# Patient Record
Sex: FEMALE | Race: WHITE
[De-identification: names, ages, dates, MRNs, and addresses within clinical notes are randomized per-mention and may not be internally consistent; named-entity substitution may affect disease eponyms.]

---

## 2019-10-02 ENCOUNTER — HOSPITAL ENCOUNTER (OUTPATIENT)
Dept: HOSPITAL 47 - FBPOP | Age: 39
Discharge: HOME | End: 2019-10-02
Attending: OBSTETRICS & GYNECOLOGY
Payer: COMMERCIAL

## 2019-10-02 VITALS
RESPIRATION RATE: 16 BRPM | HEART RATE: 88 BPM | TEMPERATURE: 98 F | DIASTOLIC BLOOD PRESSURE: 70 MMHG | SYSTOLIC BLOOD PRESSURE: 133 MMHG

## 2019-10-02 DIAGNOSIS — O26.852: Primary | ICD-10-CM

## 2019-10-02 DIAGNOSIS — Z3A.24: ICD-10-CM

## 2019-10-02 PROCEDURE — 96365 THER/PROPH/DIAG IV INF INIT: CPT

## 2019-10-02 PROCEDURE — 99214 OFFICE O/P EST MOD 30 MIN: CPT

## 2019-10-02 PROCEDURE — 76815 OB US LIMITED FETUS(S): CPT

## 2019-10-02 NOTE — US
EXAMINATION TYPE: US OB limited

 

DATE OF EXAM: 10/2/2019

 

COMPARISON: NONE

 

CLINICAL HISTORY: bleeding, placental postion. Bleeding, placental position. Rule out retroplacental 
bleeding/Clotting. .

 

EXAM PERFORMED:  Transabdominal (TA)

 

GESTATIONAL AGE / DATING

Physician Established: (24 weeks/2 days)

** EDC: 2020

 

** No growth performed on today?s study per ordering physician 

 

FETAL SURVEY

 

PLACENTA:  Posterior

The placenta does appear low. Unable to visualize cervical canal to determine how close placenta is t
o cervix. Limited due to shadowing.

 

Hypoechoic area seen within the placenta measuring: 1.6 x 1.7 x 1.5 cm. 

Hypoechoic areas seen posterior to placenta -?Normal placental attachment?

 

HEART RATE:  142 bpm

RHYTHM:  Normal

 

 

 

 

 

IMPRESSION:  Low lying posterior placenta. No definite placenta previa. No evidence of placental abru
ption.

## 2019-10-13 NOTE — P.MSEPDOC
Presenting Problems





- Arrival Data


Date of Arrival on Unit: 10/02/19


Time of Arrival on Unit: 18:41


Mode of Transport: Portable





- Complaint


OB-Reason for Admission/Chief Complaint: Vaginal Bleeding


Comment: blood clot passed at home





Prenatal Medical History





- Pregnancy Information


: 1


Para: 0


Term: 0


: 0


Abortions: Spontaneous or Elective: 0


Number of Living Children: 0





- Gestational Age


Gestational Age by MICHAEL (wks/days): 24 Weeks and 2 Days





Review of Systems





- Review of Systems


Constitutional: No problems


Breast: No problems


ENT: No problems


Cardiovascular: No problems


Respiratory: No problems


Gastrointestinal: No problems


Genitourinary: No problems


Musculoskeletal: No problems


Neurological: No problems


Skin: No problems





Vital Signs





- Temperature


Temperature: 98 F


Temperature Source: Temporal Artery Scan





- Pulse


  ** Right Brachial


Pulse Rate: 88


Pulse Assessment Method: Automatic Cuff





- Respirations


Respiratory Rate: 16


Oxygen Delivery Method: Room Air





- Blood Pressure


  ** Right Arm


Blood Pressure: 133/70


Blood Pressure Mean: 91


Blood Pressure Source: Automatic Cuff





Medical Screen Scoring (Pre)





- Cervical Exam


Dilation: Exam Deferred


Effacement: Exam Deferred


Membranes: Intact





- Uterine Contractions


Frequency: N/A


Duration: N/A


Intensity: N/A





- Maternal Vital Signs


Maternal Temperature: N/A


Maternal Blood Pressure: N/A


Signs of Preeclampsia: N/A


Maternal Respirations: N/A





- Maternal Trauma


Maternal Trauma: N/A





- Fetal Assessment - Baby A


Baseline FHR: 125-130


Fetal Heart Rate - NICHD Category: Category I (Normal) = 0


Fetal Position: N/A


Fetal Station: N/A





- Total Score - Baby A


Total Score - Baby A: 0





- Total Score - Baby B


Total Score - Baby B: 0





- Total Score - Baby C


Total Score - Baby C: 0





- Level of Risk - Baby A


Level of Risk - Baby A: Low (0-5)





- Level of Risk - Baby B


Level of Risk - Baby B: Low (0-5)





- Level of Risk - Baby C


Level of Risk - Baby C: Low (0-5)





Medical Screen Scoring (Post)





- Fetal Assessment - Baby A


Fetal Heart Rate: 140


Fetal Heart Rate - NICHD Category: Category I (Normal) = 0





- Total Score


Total Score - Baby A: 0





- Post Treatment Level of Risk


Post Treatment Level of Risk - Baby A: Low (0-5)





Physician Notification (Post)





- Physician Notified


Physician Notified Date: 10/02/19


Physician Notified Time: 20:46


Spoke With: Juanita


New Order Received: Yes





- Notification Comment


Comment: u/s states low llying placenta, no evidence of abruption. Dicharge with

instruction to follow up with Alessandro tomorrow, pelvic rest until then.





Disposition





- Disposition


OB Disposition: Discharge to home, Written follow up instructions reviewed


Discharge Date: 10/02/19


Discharge Time: 20:55


I agree with the RN Medical Screening Exam: Yes


Risk & Benefit of care provided described in d/c instruction: Yes


Diagnosis: SPOTTING COMPLICATING PREGNANCY, SECOND TRIMESTER

## 2020-01-18 ENCOUNTER — HOSPITAL ENCOUNTER (INPATIENT)
Dept: HOSPITAL 47 - FBPOP | Age: 40
LOS: 3 days | Discharge: HOME | End: 2020-01-21
Attending: OBSTETRICS & GYNECOLOGY | Admitting: OBSTETRICS & GYNECOLOGY
Payer: COMMERCIAL

## 2020-01-18 VITALS — RESPIRATION RATE: 16 BRPM

## 2020-01-18 DIAGNOSIS — Z82.49: ICD-10-CM

## 2020-01-18 DIAGNOSIS — O32.4XX0: Primary | ICD-10-CM

## 2020-01-18 DIAGNOSIS — Z3A.39: ICD-10-CM

## 2020-01-18 DIAGNOSIS — O33.9: ICD-10-CM

## 2020-01-18 LAB
BASOPHILS # BLD AUTO: 0.1 K/UL (ref 0–0.2)
BASOPHILS NFR BLD AUTO: 1 %
EOSINOPHIL # BLD AUTO: 0.2 K/UL (ref 0–0.7)
EOSINOPHIL NFR BLD AUTO: 1 %
ERYTHROCYTE [DISTWIDTH] IN BLOOD BY AUTOMATED COUNT: 4.05 M/UL (ref 3.8–5.4)
ERYTHROCYTE [DISTWIDTH] IN BLOOD: 14.1 % (ref 11.5–15.5)
HCT VFR BLD AUTO: 36.2 % (ref 34–46)
HGB BLD-MCNC: 11.6 GM/DL (ref 11.4–16)
LYMPHOCYTES # SPEC AUTO: 1.3 K/UL (ref 1–4.8)
LYMPHOCYTES NFR SPEC AUTO: 12 %
MCH RBC QN AUTO: 28.5 PG (ref 25–35)
MCHC RBC AUTO-ENTMCNC: 31.9 G/DL (ref 31–37)
MCV RBC AUTO: 89.4 FL (ref 80–100)
MONOCYTES # BLD AUTO: 0.4 K/UL (ref 0–1)
MONOCYTES NFR BLD AUTO: 3 %
NEUTROPHILS # BLD AUTO: 9.5 K/UL (ref 1.3–7.7)
NEUTROPHILS NFR BLD AUTO: 82 %
PLATELET # BLD AUTO: 241 K/UL (ref 150–450)
WBC # BLD AUTO: 11.7 K/UL (ref 3.8–10.6)

## 2020-01-18 PROCEDURE — 99213 OFFICE O/P EST LOW 20 MIN: CPT

## 2020-01-18 PROCEDURE — 84112 EVAL AMNIOTIC FLUID PROTEIN: CPT

## 2020-01-18 PROCEDURE — 85025 COMPLETE CBC W/AUTO DIFF WBC: CPT

## 2020-01-18 PROCEDURE — 59025 FETAL NON-STRESS TEST: CPT

## 2020-01-18 PROCEDURE — 86901 BLOOD TYPING SEROLOGIC RH(D): CPT

## 2020-01-18 PROCEDURE — 86900 BLOOD TYPING SEROLOGIC ABO: CPT

## 2020-01-18 PROCEDURE — 86850 RBC ANTIBODY SCREEN: CPT

## 2020-01-18 RX ADMIN — POTASSIUM CHLORIDE SCH MLS/HR: 14.9 INJECTION, SOLUTION INTRAVENOUS at 13:15

## 2020-01-18 RX ADMIN — POTASSIUM CHLORIDE SCH MLS/HR: 14.9 INJECTION, SOLUTION INTRAVENOUS at 16:34

## 2020-01-18 RX ADMIN — POTASSIUM CHLORIDE SCH MLS/HR: 14.9 INJECTION, SOLUTION INTRAVENOUS at 14:33

## 2020-01-18 RX ADMIN — POTASSIUM CHLORIDE SCH MLS/HR: 14.9 INJECTION, SOLUTION INTRAVENOUS at 10:32

## 2020-01-18 RX ADMIN — AMPICILLIN SODIUM SCH MLS/HR: 1 INJECTION, POWDER, FOR SOLUTION INTRAMUSCULAR; INTRAVENOUS at 23:54

## 2020-01-18 RX ADMIN — AMPICILLIN SODIUM SCH MLS/HR: 1 INJECTION, POWDER, FOR SOLUTION INTRAMUSCULAR; INTRAVENOUS at 19:32

## 2020-01-19 RX ADMIN — KETOROLAC TROMETHAMINE SCH MG: 30 INJECTION, SOLUTION INTRAMUSCULAR at 02:29

## 2020-01-19 RX ADMIN — KETOROLAC TROMETHAMINE SCH: 30 INJECTION, SOLUTION INTRAMUSCULAR at 18:30

## 2020-01-19 RX ADMIN — DOCUSATE SODIUM AND SENNOSIDES SCH: 50; 8.6 TABLET ORAL at 21:29

## 2020-01-19 RX ADMIN — IBUPROFEN PRN MG: 600 TABLET ORAL at 21:26

## 2020-01-19 RX ADMIN — POTASSIUM CHLORIDE SCH MLS/HR: 14.9 INJECTION, SOLUTION INTRAVENOUS at 04:35

## 2020-01-19 RX ADMIN — DOCUSATE SODIUM AND SENNOSIDES SCH: 50; 8.6 TABLET ORAL at 09:39

## 2020-01-19 RX ADMIN — POTASSIUM CHLORIDE SCH: 14.9 INJECTION, SOLUTION INTRAVENOUS at 18:30

## 2020-01-19 RX ADMIN — POTASSIUM CHLORIDE SCH: 14.9 INJECTION, SOLUTION INTRAVENOUS at 10:16

## 2020-01-19 RX ADMIN — KETOROLAC TROMETHAMINE SCH: 30 INJECTION, SOLUTION INTRAMUSCULAR at 12:28

## 2020-01-19 NOTE — P.PNOBGPC
Subjective





- Subjective


Principal diagnosis: Postop day 0


Interval history: 





Patient is doing very well this morning.  She is being to ambulate and she is 

tolerating a liquid diet thus far.  Continue current care.


Patient reports: Reports appetite normal, Reports voiding normally, Reports pain

well controlled, Reports ambulating normally


[place]: doing well





Objective





- Vital Signs


Latest vital signs: 


                                   Vital Signs











  Temp Pulse Resp BP Pulse Ox


 


 20 12:00  98.2 F  91  16  104/60  98


 


 20 08:00  98.1 F  97  16  100/68  99


 


 20 03:55  97.6 F  80  16  102/64  99


 


 20 03:25  96.9 F L  91  16  99/60  98


 


 20 02:55  96.8 F L  86  16  112/60  96


 


 20 02:40  97.2 F L  77  16  100/67  97


 


 20 02:25   86  16  93/67  96


 


 20 02:10  97.0 F L  76  16  93/61  97


 


 20 01:55  96.8 F L  78  16  95/62  97








                                Intake and Output











 20





 22:59 06:59 14:59


 


Output Total 300 250 350


 


Balance -300 -250 -350


 


Output:   


 


  Urine 300 250 350


 


Other:   


 


  # Voids   1














- Exam


Lungs: bilateral: normal


Chest: Normal S1, Normal S2


Extremities: Present: normal


Abdomen: Present: normal appearance, soft.  Absent: distention, tenderness


Incision: Present: normal, dry, intact


Uterus: Present: normal, firm

## 2020-01-19 NOTE — P.HPOB
History of Present Illness


H&P Date: 20


Chief Complaint: Intrauterine pregnancy at term: Advanced maternal age





Patient is a 39-year-old female  at 39 weeks 5 days gestation who arrives 

complaining of spontaneous rupture membranes.  She relates she ruptured her 

memories approximately 8 AM and clear fluid was noted.  An Essure was positive. 

She was not having any contractions or feeling any increase in pressure.  She 

was dilated to 120 cm 60 is 70% effaced -3 station.  She was admitted for labor 

and group B strep prophylaxis was initiated.  Pertinent labs fluid A+ blood 

type, Rh and it was negative, rubella is immune, hepatitis B surface antigen and

RPR are both negative.  Her pregnancy course other than advanced maternal age 

has been relatively unremarkable.  She was a transfer to my care sometime around

20 weeks.  She's been followed with nonstress tests closely through the latter 

part of the pregnancy.  All questions were answered for this morning and Pitocin

augmentation of labor is expected.  We'll plan epidural when she is in active 

labor.  A category 1 tracing is noted at this time.





Past Medical History


Past Medical History: No Reported History


History of Any Multi-Drug Resistant Organisms: None Reported


Additional Past Surgical History / Comment(s): wisdom teeth


Past Anesthesia/Blood Transfusion Reactions: No Reported Reaction


Past Psychological History: No Psychological Hx Reported


Smoking Status: Never smoker





- Past Family History


  ** Mother


Family Medical History: Hypertension





Medications and Allergies


                                Home Medications











 Medication  Instructions  Recorded  Confirmed  Type


 


Pnv No.95/Ferrous Fum/Folic AC 1 each PO 10/02/19  History





[Prenatal Multivitamin Tablet]    


 


Pnv No.95/Ferrous Fum/Folic AC 1 each PO DAILY 10/02/19 10/02/19 History





[Prenatal Multivitamin Tablet]    








                                    Allergies











Allergy/AdvReac Type Severity Reaction Status Date / Time


 


No Known Allergies Allergy   Verified 20 09:28














Exam


Osteopathic Statement: *.  No significant issues noted on an osteopathic 

structural exam other than those noted in the History and Physical/Consult.


                                   Vital Signs











  Temp Pulse Resp BP Pulse Ox


 


 20 10:05  96.4 F L  81  16  120/79  100


 


 20 09:44  96.4 F L  81  16  120/79  100








                                Intake and Output











 20





 14:59 22:59 06:59


 


Other:   


 


  # Voids 1  


 


  Weight 74.389 kg  














- OBG Physical Exam


Breast: both: normal (no masses)


Abdomen: bowel sounds normal, no diffuse tenderness, no bruit present, no 

guarding noted, no hepatomegaly, no splenomegaly, no mass


Vulva: both: normal


Vagina: normal moisture, no discharge


Cervix: no lesion, no discharge


Uterus: normal size, normal contour


Adnexa: both: normal


Anus/Rectum: normal perianal skin, no rectal mass, no hemorrhoids, heme negative





Results


Result Diagrams: 


                                 20 10:30





                  Abnormal Lab Results - Last 24 Hours (Table)











  20 Range/Units





  10:30 


 


WBC  11.7 H  (3.8-10.6)  k/uL


 


Neutrophils #  9.5 H  (1.3-7.7)  k/uL

## 2020-01-19 NOTE — P.OP
Date of Procedure: 20


Preoperative Diagnosis: 


Intrauterine pregnancy at term: Failure to descend


Postoperative Diagnosis: 


Same with oh position and cephalopelvic disproportion


Procedure(s) Performed: 


Primary low transverse  section


Anesthesia: spinal


Surgeon: George Rucker


Assistant #1: Tea Samson


Estimated Blood Loss (ml): 400


IV fluids (ml): 1,000


Urine output (ml): 100


Pathology: none sent


Condition: stable


Disposition: floor


Operative Findings: 


Female  Apgar scores of 8 and 9 at one and 5 minutes [place] and the 

weight was 7 lbs. 13 oz.  During the course of the  section there was 

blood noted in the catheter and therefore sterile milk was used to verify 

patency of the bladder


Description of Procedure: 


Patient was taken to the operating suite where a spinal anesthetic was found be 

adequate.  She was prepped and draped in normal sterile fashion and placed in 

dorsal lithotomy position.  Risks and benefits had Kartik been reviewed with the 

patient.  She had been pushing for a little over 2-1/2 hours and had made 

minimal descent and a  section was indicated.  Once she was prepped and 

draped and placed in dorsal supine position with leftward tilt, a Pfannenstiel 

skin incision was made.  This incision was then carried through to underlying 

layer of the fascia with the second knife and the fascia was then nicked in the 

midline.  This opening was then extended with Person scissors.  Superior and 

inferior aspect of this incision were then grasped tented up and bluntly and 

sharply dissected off the rectus muscles.  Rectus muscles were then divided the 

midline and blunt dissection through the peritoneum was made.  This opening was 

then extended superiorly and inferiorly with good visualization of both bowel 

bladder.  Bladder blade was then placed and the knife was then used to incise 

the uterus well above the bladder flap.  This was fully developed with hemostat 

and then bluntly extended.  Head was then atraumatically delivered.  Large Was 

noted occiput posterior position was noted it is noted that the baby was well in

the pelvis and well applied in this likely caused the blood in her urine.  Once 

baby's head was delivered anterior posterior shoulders were easily delivered and

meconium was noted at the point of entry into the uterus.  Mouth and nares were 

bulb suctioned and nursery personnel was present present to assume care.  Umbili

pacheco cord was then clamped cut usual fashion.  Once this was accomplished 

placenta was then delivered intact and Pitocin was added to the IV.  Uterus was 

then exteriorized cleared of clots and debris and closed in 2 layers with 0 

Vicryl suture following delineation of boundaries with Allis clamps.  Once this 

was completed and excellent hemostasis was obtained it was noted that there was 

blood in the Hammond catheter bag and some in the tubing.  Sterile milk was then 

instilled into the bladder retrograde by taking approximately between 182 and 40

mL of sterile milk and injecting it into the bladder.  Bladder patency was 

verified no spillage or other damage to the bladder was noted therefore Hammond 

cath was re-established and using hemostats to delineate the peritoneum was 

reapproximated with 0 Vicryl suture.  Fascial layer was then closed with 0 

Vicryl suture.  One layer of 3-0 Vicryl was placed in deep subcuticular tissues 

to reapproximate the skin and close the dead space.  Skin was then closed with 

3-0 Vicryl subcuticularly.  Sponge, lap, needle counts were all correct 2.  

Patient was then taken to the recovery room in stable and satisfactory 

condition.  Urine in the tubing is already clearing to yellow.

## 2020-01-20 LAB
BASOPHILS # BLD AUTO: 0 K/UL (ref 0–0.2)
BASOPHILS NFR BLD AUTO: 0 %
EOSINOPHIL # BLD AUTO: 0.1 K/UL (ref 0–0.7)
EOSINOPHIL NFR BLD AUTO: 1 %
ERYTHROCYTE [DISTWIDTH] IN BLOOD BY AUTOMATED COUNT: 3.1 M/UL (ref 3.8–5.4)
ERYTHROCYTE [DISTWIDTH] IN BLOOD: 14.1 % (ref 11.5–15.5)
HCT VFR BLD AUTO: 28 % (ref 34–46)
HGB BLD-MCNC: 9 GM/DL (ref 11.4–16)
LYMPHOCYTES # SPEC AUTO: 1.3 K/UL (ref 1–4.8)
LYMPHOCYTES NFR SPEC AUTO: 10 %
MCH RBC QN AUTO: 29.1 PG (ref 25–35)
MCHC RBC AUTO-ENTMCNC: 32.2 G/DL (ref 31–37)
MCV RBC AUTO: 90.3 FL (ref 80–100)
MONOCYTES # BLD AUTO: 0.4 K/UL (ref 0–1)
MONOCYTES NFR BLD AUTO: 3 %
NEUTROPHILS # BLD AUTO: 10.7 K/UL (ref 1.3–7.7)
NEUTROPHILS NFR BLD AUTO: 84 %
PLATELET # BLD AUTO: 217 K/UL (ref 150–450)
WBC # BLD AUTO: 12.7 K/UL (ref 3.8–10.6)

## 2020-01-20 RX ADMIN — DOCUSATE SODIUM AND SENNOSIDES SCH: 50; 8.6 TABLET ORAL at 21:29

## 2020-01-20 RX ADMIN — HYDROCODONE BITARTRATE AND ACETAMINOPHEN PRN EACH: 7.5; 325 TABLET ORAL at 08:19

## 2020-01-20 RX ADMIN — IBUPROFEN PRN MG: 600 TABLET ORAL at 09:34

## 2020-01-20 RX ADMIN — DOCUSATE SODIUM AND SENNOSIDES SCH EACH: 50; 8.6 TABLET ORAL at 23:45

## 2020-01-20 RX ADMIN — HYDROCODONE BITARTRATE AND ACETAMINOPHEN PRN EACH: 7.5; 325 TABLET ORAL at 15:12

## 2020-01-20 RX ADMIN — IBUPROFEN PRN MG: 600 TABLET ORAL at 21:03

## 2020-01-20 NOTE — P.PN
Progress Note - Text


Progress Note Date: 20


Postoperative day 1 status post  section under spinal anesthesia, and i

ntrathecal morphine given for postoperative analgesia, patient doing well, there

is no anesthesia related complications, 


Patient had no headache, vital signs stable , 


Assessment and plan= postop day 1 status post , doing well there is no 

anesthesia related  complication.

## 2020-01-21 VITALS — SYSTOLIC BLOOD PRESSURE: 114 MMHG | TEMPERATURE: 97.6 F | HEART RATE: 94 BPM | DIASTOLIC BLOOD PRESSURE: 72 MMHG

## 2020-01-21 RX ADMIN — HYDROCODONE BITARTRATE AND ACETAMINOPHEN PRN EACH: 7.5; 325 TABLET ORAL at 08:49

## 2020-01-21 RX ADMIN — DOCUSATE SODIUM AND SENNOSIDES SCH EACH: 50; 8.6 TABLET ORAL at 08:50

## 2020-01-21 RX ADMIN — IBUPROFEN PRN MG: 600 TABLET ORAL at 15:35

## 2020-01-21 NOTE — P.DS
Providers


Date of admission: 


01/18/20 09:44





Expected date of discharge: 01/21/20


Attending physician: 


George Rucker





Primary care physician: 


Stated None





Hospital Course: 





Patient is doing very well post op day 2.  She is involuting, voiding and she 

has not had a bowel movement.  She is requesting discharge to home today.  Vital

signs are stable and afebrile.  Heart regular, lungs clear, extremities without 

pain.  Abdomen soft and in her urine incision is clean dry and intact.  

Prescription for Motrin and Norco or provided.  All questions are answered for 

her at this time and she is stable for discharge this time.  She will follow up 

with me in 1 week.


Patient Condition at Discharge: Good





Plan - Discharge Summary


New Discharge Prescriptions: 


New


   Ibuprofen [Motrin] 600 mg PO Q6HR PRN #30 tab


     PRN Reason: Pain


   HYDROcodone/APAP 5-325MG [Norco 5-325] 1 tab PO Q4HR PRN #30 tab


     PRN Reason: Pain





No Action


   Pnv No.95/Ferrous Fum/Folic AC [Prenatal Multivitamin Tablet] 1 each PO DAILY


   Pnv No.95/Ferrous Fum/Folic AC [Prenatal Multivitamin Tablet] 1 each PO


Discharge Medication List





Pnv No.95/Ferrous Fum/Folic AC [Prenatal Multivitamin Tablet] 1 each PO 10/02/19

[History]


Pnv No.95/Ferrous Fum/Folic AC [Prenatal Multivitamin Tablet] 1 each PO DAILY 

10/02/19 [History]


HYDROcodone/APAP 5-325MG [Norco 5-325] 1 tab PO Q4HR PRN #30 tab 01/21/20 [Rx]


Ibuprofen [Motrin] 600 mg PO Q6HR PRN #30 tab 01/21/20 [Rx]








Follow up Appointment(s)/Referral(s): 


George Rucker DO [Doctor of Osteopathic Medicine] - 1 Week


Activity/Diet/Wound Care/Special Instructions: 


No heavy lifting, limit stairs and driving, and pelvic rest.  If any high 

temperatures, heavy bleeding, or severe pain call my office


Discharge Disposition: HOME SELF-CARE

## 2020-01-21 NOTE — P.PNOBGPC
Subjective





- Subjective


Principal diagnosis: Postoperative day 2


Interval history: 





Patient is doing very well this morning other than pain.  She is involuting, 

voiding and she is passing flatus.  She has not had a bowel movement.  Otherwise

she is doing well.  Her incision is intact.


Patient reports: Reports appetite normal, Reports voiding normally, Reports pain

well controlled, Reports ambulating normally


: doing well





Objective





- Vital Signs


Latest vital signs: 


                                   Vital Signs











  Temp Pulse Resp BP Pulse Ox


 


 20 00:00  98 F  97  16  124/76  98


 


 20 16:00  97.9 F  93  16  104/66 


 


 20 08:42  98.6 F  97  16  103/59 














- Exam


Lungs: bilateral: normal


Chest: Normal S1, Normal S2


Extremities: Present: normal


Abdomen: Present: normal appearance, soft.  Absent: distention, tenderness


Incision: Present: normal, dry, intact


Uterus: Present: normal, firm





- Labs


Labs: 


                  Abnormal Lab Results - Last 24 Hours (Table)











  20 Range/Units





  06:53 


 


WBC  12.7 H  (3.8-10.6)  k/uL


 


RBC  3.10 L  (3.80-5.40)  m/uL


 


Hgb  9.0 L D  (11.4-16.0)  gm/dL


 


Hct  28.0 L  (34.0-46.0)  %


 


Neutrophils #  10.7 H  (1.3-7.7)  k/uL

## 2025-07-17 ENCOUNTER — HOSPITAL ENCOUNTER (OUTPATIENT)
Dept: HOSPITAL 47 - LABWHC1 | Age: 45
Discharge: HOME | End: 2025-07-17
Attending: FAMILY MEDICINE
Payer: COMMERCIAL

## 2025-07-17 DIAGNOSIS — N28.9: ICD-10-CM

## 2025-07-17 DIAGNOSIS — E78.5: ICD-10-CM

## 2025-07-17 DIAGNOSIS — E83.51: ICD-10-CM

## 2025-07-17 DIAGNOSIS — E87.8: ICD-10-CM

## 2025-07-17 DIAGNOSIS — D64.9: ICD-10-CM

## 2025-07-17 DIAGNOSIS — Z00.00: Primary | ICD-10-CM

## 2025-07-17 DIAGNOSIS — E11.9: ICD-10-CM

## 2025-07-17 DIAGNOSIS — R74.01: ICD-10-CM

## 2025-07-17 LAB
ACANTHOCYTES BLD QL SMEAR: (no result)
AGRAN PLATELETS BLD QL SMEAR: (no result)
ALBUMIN SERPL-MCNC: 4.5 G/DL (ref 3.8–4.9)
ALBUMIN/GLOB SERPL: 1.96 RATIO (ref 1.6–3.17)
ALP SERPL-CCNC: 70 U/L (ref 41–126)
ALT SERPL-CCNC: 13 U/L (ref 8–44)
ANION GAP SERPL CALC-SCNC: 9.3 MMOL/L (ref 4–12)
ANISOCYTOSIS BLD QL SMEAR: (no result)
ANISOCYTOSIS BLD QL: (no result)
AST SERPL-CCNC: 14 U/L (ref 13–35)
AUER BODIES BLD QL SMEAR: (no result)
BASO STIPL BLD QL SMEAR: (no result)
BASOPHILS # BLD AUTO: 0.03 X 10*3/UL (ref 0–0.1)
BASOPHILS # BLD MANUAL: (no result) K/UL (ref 0–0.2)
BASOPHILS NFR BLD AUTO: 0.5 %
BASOPHILS NFR SPEC MANUAL: (no result) %
BILIRUB BLD-MCNC: 0.4 MG/DL (ref 0.3–1.2)
BLASTS # BLD MANUAL: (no result) %
BLASTS # BLD MANUAL: (no result) K/UL
BUN SERPL-SCNC: 7.9 MG/DL (ref 9–27)
BUN/CREAT SERPL: 9.88 RATIO (ref 12–20)
BURR CELLS BLD QL SMEAR: (no result)
BURR CELLS BLD QL SMEAR: (no result)
CALCIUM SPEC-MCNC: 9.2 MG/DL (ref 8.7–10.3)
CELLS COUNTED: (no result)
CHLORIDE SERPL-SCNC: 105 MMOL/L (ref 96–109)
CHOLEST SERPL-MCNC: 201 MG/DL (ref 0–200)
CHOLEST/HDLC SERPL: 2.91 RATIO
CO2 SERPL-SCNC: 24.7 MMOL/L (ref 21.6–31.8)
CREATININE: 0.8 MG/DL (ref 0.6–1.5)
DACRYOCYTES BLD QL SMEAR: (no result)
DOHLE BOD BLD QL SMEAR: (no result)
ELLIPTOCYTES BLD QL SMEAR: (no result)
EOSINOPHIL # BLD AUTO: 0.13 X 10*3/UL (ref 0.04–0.35)
EOSINOPHIL # BLD MANUAL: (no result) K/UL (ref 0–0.7)
EOSINOPHIL NFR BLD AUTO: 2 %
EOSINOPHIL NFR BLD MANUAL: (no result) %
ERYTHROCYTE [DISTWIDTH] IN BLOOD BY AUTOMATED COUNT: 4.62 X 10*6/UL (ref 4.1–5.2)
ERYTHROCYTE [DISTWIDTH] IN BLOOD: 12.8 % (ref 11.5–14.5)
GIANT PLATELETS BLD QL SMEAR: (no result)
GLOBULIN SER CALC-MCNC: 2.3 G/DL (ref 1.6–3.3)
GLUCOSE SERPL-MCNC: 106 MG/DL (ref 70–110)
HCT VFR BLD AUTO: 42.5 % (ref 37.2–46.3)
HDLC SERPL-MCNC: 69 MG/DL (ref 40–60)
HELMET CELLS BLD QL SMEAR: (no result)
HGB BLD-MCNC: 13.7 G/DL (ref 12–15)
HOWELL-JOLLY BOD BLD QL SMEAR: (no result)
HYPERCHROMASIA: (no result)
HYPOCHROMIA BLD QL SMEAR: (no result)
HYPOCHROMIA BLD QL: (no result)
IMM GRANULOCYTES # BLD: 0.02 X 10*3/UL (ref 0–0.04)
IMM GRANULOCYTES BLD QL AUTO: 0.3 %
LDLC SERPL CALC-MCNC: 115.1 MG/DL (ref 0–131)
LDLC SERPL DIRECT ASSAY-MCNC: (no result) MG/DL
LG PLATELETS BLD QL SMEAR: (no result)
LYMPHOCYTES # BLD MANUAL: (no result) K/UL (ref 1–4.8)
LYMPHOCYTES # SPEC AUTO: 1.67 X 10*3/UL (ref 0.9–5)
LYMPHOCYTES NFR BLD MANUAL: (no result) %
LYMPHOCYTES NFR SPEC AUTO: 25.7 %
Lab: (no result)
Lab: (no result)
MACROCYTES BLD QL SMEAR: (no result)
MACROCYTES BLD QL: (no result)
MCH RBC QN AUTO: 29.7 PG (ref 27–32)
MCHC RBC AUTO-ENTMCNC: 32.2 G/DL (ref 32–37)
MCV RBC AUTO: 92 FL (ref 80–97)
METAMYELOCYTES # BLD: (no result) K/UL
METAMYELOCYTES NFR BLD MANUAL: (no result) %
MICROCYTES BLD QL SMEAR: (no result)
MICROCYTOSIS: (no result)
MONOCYTES # BLD AUTO: 0.41 X 10*3/UL (ref 0.2–1)
MONOCYTES # BLD MANUAL: (no result) K/UL (ref 0–1)
MONOCYTES NFR BLD AUTO: 6.3 %
MONOCYTES NFR BLD MANUAL: (no result) %
MYELOCYTES # BLD MANUAL: (no result) K/UL
MYELOCYTES NFR BLD MANUAL: (no result) %
NEUTROPHILS # BLD AUTO: 4.24 X 10*3/UL (ref 1.8–7.7)
NEUTROPHILS NFR BLD AUTO: 65.2 %
NEUTROPHILS NFR BLD MANUAL: (no result) %
NEUTS BAND # BLD MANUAL: (no result) K/UL
NEUTS BAND NFR BLD: (no result) %
NEUTS HYPERSEG # BLD: (no result) 10*3/UL
NEUTS SEG # BLD MANUAL: (no result) K/UL (ref 1.3–7.7)
NRBC # BLD: (no result) /100 WBC (ref 0–0)
NRBC BLD AUTO-RTO: 0 X 10*3/UL (ref 0–0.01)
OTHER CELLS NFR BLD MANUAL: (no result) %
OVALOCYTES BLD QL SMEAR: (no result)
PAPPENHEIMER BOD BLD QL SMEAR: (no result)
PARASITE [PRESENCE] IN BLOOD BY LIGHT MICROSCOPY: (no result)
PELGER HUET CELLS BLD QL SMEAR: (no result)
PLASMA CELLS # BLD MANUAL: (no result) %
PLASMA CELLS # BLD MANUAL: (no result) K/UL
PLATELET # BLD AUTO: 268 X 10*3/UL (ref 140–440)
PLATELETS.RETICULATED NFR BLD AUTO: (no result) %
PMV BLD AUTO: 10.5 FL (ref 9.5–12.2)
POIKILOCYTOSIS BLD QL SMEAR: (no result)
POIKILOCYTOSIS BLD QL SMEAR: (no result)
POIKILOCYTOSIS: (no result)
POLYCHROMASIA BLD QL SMEAR: (no result)
POTASSIUM SERPL-SCNC: 4.3 MMOL/L (ref 3.5–5.5)
PROMYELOCYTES # BLD: (no result) K/UL
PROMYELOCYTES NFR BLD MANUAL: (no result) %
PROT SERPL-MCNC: 6.8 G/DL (ref 6.2–8.2)
RBC AGGLUTINATION: (no result)
RBC MORPH BLD: (no result)
ROULEAUX BLD QL SMEAR: (no result)
SCHISTOCYTES BLD QL SMEAR: (no result)
SCHISTOCYTES BLD QL SMEAR: (no result)
SICKLE CELLS BLD QL SMEAR: (no result)
SMUDGE CELLS BLD QL SMEAR: (no result)
SODIUM SERPL-SCNC: 139 MMOL/L (ref 135–145)
SPHEROCYTES BLD QL SMEAR: (no result)
STOMATOCYTES BLD QL SMEAR: (no result)
TARGETS BLD QL SMEAR: (no result)
TOXIC GRANULES BLD QL SMEAR: (no result)
TRIGL SERPL-MCNC: 84.3 MG/DL (ref 0–149)
VARIANT LYMPHS BLD QL SMEAR: (no result)
VARIANT LYMPHS BLD QL SMEAR: (no result)
VLDLC SERPL CALC-MCNC: 16.86 MG/DL (ref 5–40)
WBC # BLD AUTO: 6.5 X 10*3/UL (ref 4.5–10)
WBC NRBC COR # BLD: (no result) K/UL
WBC TOXIC VACUOLES BLD QL SMEAR: (no result)

## 2025-07-17 PROCEDURE — 80061 LIPID PANEL: CPT

## 2025-07-17 PROCEDURE — 85025 COMPLETE CBC W/AUTO DIFF WBC: CPT

## 2025-07-17 PROCEDURE — 36415 COLL VENOUS BLD VENIPUNCTURE: CPT

## 2025-07-17 PROCEDURE — 80053 COMPREHEN METABOLIC PANEL: CPT

## 2025-07-17 PROCEDURE — 86480 TB TEST CELL IMMUN MEASURE: CPT
